# Patient Record
Sex: MALE
[De-identification: names, ages, dates, MRNs, and addresses within clinical notes are randomized per-mention and may not be internally consistent; named-entity substitution may affect disease eponyms.]

---

## 2021-05-05 ENCOUNTER — HOSPITAL ENCOUNTER (OUTPATIENT)
Dept: HOSPITAL 53 - M RAD | Age: 51
End: 2021-05-05
Attending: INTERNAL MEDICINE

## 2021-05-05 DIAGNOSIS — M17.12: Primary | ICD-10-CM

## 2021-05-05 DIAGNOSIS — M51.37: ICD-10-CM

## 2021-05-06 NOTE — REP
INDICATION:

DDD



COMPARISON:

None.



TECHNIQUE:

AP, lateral, bilateral oblique and sunrise views.



FINDINGS:

Mild degenerative changes include increased sclerosis along the tibial plateau with

minimal medial joint space narrowing.  Patellofemoral joint appears relatively normal.

No acute fracture or dislocation.  No obvious effusion.



IMPRESSION:

Mild degenerative changes.





<Electronically signed by Cecil Olivarez > 05/06/21 0644

## 2021-05-06 NOTE — REP
INDICATION:

DDD



COMPARISON:

None.



TECHNIQUE:

AP, lateral, coned-down views of the lumbar spine.



FINDINGS:

Three views of the lumbosacral spine demonstrate satisfactory alignment and lordosis

without acute fracture / compression injury or subluxation.  No significant

degenerative changes are appreciated.



IMPRESSION:

1. No acute fracture / compression injury or subluxation.

2. Essentially age-appropriate examination.





<Electronically signed by Cecil Olivarez > 05/06/21 0609